# Patient Record
Sex: FEMALE
[De-identification: names, ages, dates, MRNs, and addresses within clinical notes are randomized per-mention and may not be internally consistent; named-entity substitution may affect disease eponyms.]

---

## 2024-02-14 ENCOUNTER — APPOINTMENT (OUTPATIENT)
Dept: OTOLARYNGOLOGY | Facility: CLINIC | Age: 29
End: 2024-02-14
Payer: SELF-PAY

## 2024-02-14 ENCOUNTER — TRANSCRIPTION ENCOUNTER (OUTPATIENT)
Age: 29
End: 2024-02-14

## 2024-02-14 VITALS
BODY MASS INDEX: 46.61 KG/M2 | HEART RATE: 86 BPM | WEIGHT: 273 LBS | SYSTOLIC BLOOD PRESSURE: 125 MMHG | OXYGEN SATURATION: 100 % | DIASTOLIC BLOOD PRESSURE: 82 MMHG | TEMPERATURE: 98 F | HEIGHT: 64 IN

## 2024-02-14 DIAGNOSIS — Z82.49 FAMILY HISTORY OF ISCHEMIC HEART DISEASE AND OTHER DISEASES OF THE CIRCULATORY SYSTEM: ICD-10-CM

## 2024-02-14 DIAGNOSIS — Z78.9 OTHER SPECIFIED HEALTH STATUS: ICD-10-CM

## 2024-02-14 DIAGNOSIS — Z83.3 FAMILY HISTORY OF DIABETES MELLITUS: ICD-10-CM

## 2024-02-14 DIAGNOSIS — Z81.1 FAMILY HISTORY OF ALCOHOL ABUSE AND DEPENDENCE: ICD-10-CM

## 2024-02-14 DIAGNOSIS — Z80.9 FAMILY HISTORY OF MALIGNANT NEOPLASM, UNSPECIFIED: ICD-10-CM

## 2024-02-14 PROBLEM — Z00.00 ENCOUNTER FOR PREVENTIVE HEALTH EXAMINATION: Status: ACTIVE | Noted: 2024-02-14

## 2024-02-14 PROCEDURE — 99203 OFFICE O/P NEW LOW 30 MIN: CPT

## 2024-02-14 RX ORDER — LORAZEPAM 2 MG/1
TABLET ORAL
Refills: 0 | Status: ACTIVE | COMMUNITY

## 2024-02-14 RX ORDER — BUPROPION HYDROCHLORIDE 100 MG/1
TABLET, FILM COATED ORAL
Refills: 0 | Status: ACTIVE | COMMUNITY

## 2024-02-14 RX ORDER — ZOLPIDEM TARTRATE 5 MG/1
TABLET, FILM COATED ORAL
Refills: 0 | Status: ACTIVE | COMMUNITY

## 2024-02-14 RX ORDER — ESCITALOPRAM OXALATE 5 MG/1
TABLET, FILM COATED ORAL
Refills: 0 | Status: ACTIVE | COMMUNITY

## 2024-02-15 NOTE — DATA REVIEWED
[de-identified] : outside  from 24 revealed b nl hearing, b nl tympanograms -results reviewed with pt

## 2024-02-15 NOTE — HISTORY OF PRESENT ILLNESS
[de-identified] : 29 y/o F presents with vertigo since 23. She denies room spinning vertigo, but feels her body is in motion even when it is not. She reports she has this sensation every day when she is laying down, sitting upright, and bending over, and does not occur when she is in motion. She reports this started one evening after she was on a plane flight earlier in the day. She has seen another otolaryngologist and was prescribed Meclizine and Zyrtec which she took with minimal relief. She saw another otolaryngologist for a second opinion who diagnosed her with bppv. She had an outside  which was nl and had nl tympanograms. She denies any changes in her hearing. She has intermittent tinnitus, approximately once per week and she has not noticed this in several weeks. She has minimal otalgia. Her father had history of vertigo. Nonsmoker.

## 2024-02-15 NOTE — ASSESSMENT
[FreeTextEntry1] : vertigo, cause not clear -outside  from 24 revealed b nl hearing, b nl tympanograms -results reviewed with pt  -VNG -fistula test  -MRI -recommended flat rubber-soled shoes or bare feet (in home) RTC with VNG, fistula test, MRI to review findings; call sooner for any issues

## 2024-02-15 NOTE — PHYSICAL EXAM
[Normal] : no rashes [] : Pearl-Hallpike test is negative [de-identified] : steady gait  [de-identified] : gait steady

## 2024-02-27 ENCOUNTER — APPOINTMENT (OUTPATIENT)
Dept: OTOLARYNGOLOGY | Facility: CLINIC | Age: 29
End: 2024-02-27
Payer: COMMERCIAL

## 2024-02-27 ENCOUNTER — APPOINTMENT (OUTPATIENT)
Dept: OTOLARYNGOLOGY | Facility: CLINIC | Age: 29
End: 2024-02-27
Payer: SELF-PAY

## 2024-02-27 VITALS
HEART RATE: 112 BPM | TEMPERATURE: 97.1 F | BODY MASS INDEX: 46.61 KG/M2 | HEIGHT: 64 IN | OXYGEN SATURATION: 96 % | DIASTOLIC BLOOD PRESSURE: 74 MMHG | SYSTOLIC BLOOD PRESSURE: 123 MMHG | WEIGHT: 273 LBS

## 2024-02-27 DIAGNOSIS — R42 DIZZINESS AND GIDDINESS: ICD-10-CM

## 2024-02-27 PROCEDURE — 92550 TYMPANOMETRY & REFLEX THRESH: CPT

## 2024-02-27 PROCEDURE — 92537 CALORIC VSTBLR TEST W/REC: CPT

## 2024-02-27 PROCEDURE — 99213 OFFICE O/P EST LOW 20 MIN: CPT

## 2024-02-27 NOTE — DATA REVIEWED
[de-identified] : vng nl fistula test nl reviewed both with pt [de-identified] : mri trace r mastoid opacification- essentially nl- reviewed with pt

## 2024-02-27 NOTE — ASSESSMENT
[FreeTextEntry1] : explained to pt fistual test vng and mri essentially nl I do not think her vertigo is otologic I recommended she follow up with her pmd to consider other causes and to follow here if sx change.

## 2024-02-27 NOTE — HISTORY OF PRESENT ILLNESS
[de-identified] : 2 week follow up appt for this 29 yo f with vertigo. She experiences the sensation every day and states that she feels the same. She had vng, fistula test and mri and is here to review results.